# Patient Record
Sex: FEMALE | Race: OTHER | NOT HISPANIC OR LATINO
[De-identification: names, ages, dates, MRNs, and addresses within clinical notes are randomized per-mention and may not be internally consistent; named-entity substitution may affect disease eponyms.]

---

## 2018-06-07 ENCOUNTER — TRANSCRIPTION ENCOUNTER (OUTPATIENT)
Age: 38
End: 2018-06-07

## 2020-02-08 ENCOUNTER — TRANSCRIPTION ENCOUNTER (OUTPATIENT)
Age: 40
End: 2020-02-08

## 2020-11-30 ENCOUNTER — TRANSCRIPTION ENCOUNTER (OUTPATIENT)
Age: 40
End: 2020-11-30

## 2022-01-26 ENCOUNTER — TRANSCRIPTION ENCOUNTER (OUTPATIENT)
Age: 42
End: 2022-01-26

## 2022-06-04 ENCOUNTER — NON-APPOINTMENT (OUTPATIENT)
Age: 42
End: 2022-06-04

## 2023-03-01 ENCOUNTER — NON-APPOINTMENT (OUTPATIENT)
Age: 43
End: 2023-03-01

## 2023-05-15 ENCOUNTER — NON-APPOINTMENT (OUTPATIENT)
Age: 43
End: 2023-05-15

## 2024-01-23 ENCOUNTER — NON-APPOINTMENT (OUTPATIENT)
Age: 44
End: 2024-01-23

## 2024-02-29 ENCOUNTER — APPOINTMENT (OUTPATIENT)
Dept: ORTHOPEDIC SURGERY | Facility: CLINIC | Age: 44
End: 2024-02-29
Payer: COMMERCIAL

## 2024-02-29 VITALS — HEIGHT: 64 IN | BODY MASS INDEX: 29.02 KG/M2 | WEIGHT: 170 LBS

## 2024-02-29 DIAGNOSIS — M76.822 POSTERIOR TIBIAL TENDINITIS, LEFT LEG: ICD-10-CM

## 2024-02-29 PROBLEM — Z00.00 ENCOUNTER FOR PREVENTIVE HEALTH EXAMINATION: Status: ACTIVE | Noted: 2024-02-29

## 2024-02-29 PROCEDURE — L4361: CPT | Mod: LT

## 2024-02-29 PROCEDURE — 99203 OFFICE O/P NEW LOW 30 MIN: CPT

## 2024-02-29 RX ORDER — IBUPROFEN 600 MG/1
600 TABLET ORAL
Qty: 42 | Refills: 0 | Status: ACTIVE | COMMUNITY
Start: 2024-02-29 | End: 1900-01-01

## 2024-02-29 NOTE — HISTORY OF PRESENT ILLNESS
[de-identified] : Patient has had 43-year-old female-year-old female presents for left ankle pain.  Patient has had this pain for 6 months.  No inciting event or injury.  Patient is no swelling and pain.  Last month she went to an urgent care where x-rays were taken and read as negative for fractures.  Patient has not been doing anything for pain relief.  She states she still wears improper footwear, does not rest, does not take any medications.  When she went to urgent care she states they prescribed her something that she tried taking at night that made her very sleepy.  Patient believes it was ibuprofen 800 but also thinks it started with them.

## 2024-02-29 NOTE — DISCUSSION/SUMMARY
[de-identified] : Patient has posterior tibialis tendon of the left lower extremity.  At this time I offered patient a cam walker to weight-bear as tolerated.  Patient will wear this as needed.  I also offered a Medrol Dosepak for the inflammation patient kindly declined.  I am prescribing ibuprofen 600 mg for patient to take 3 times a day as needed for pain and inflammation.  Also advised patient to go to physical therapy for strengthening, stretching, range of motion.  I told patient to also go to podiatrist for orthotics as she may have flatfeet and this can be the source of her problems.  Patient states she has inserts from [employer] already.  Patient would like to follow-up with Dr. Perez soon as possible for injections.  I explained he typically does not do injections for this and he probably will not do them right away as she has not tried any conservative treatment yet.  However she is persistent and would like to see him soon as possible so we will have her follow-up in a few weeks.  This plan all questions were answered today

## 2024-02-29 NOTE — PHYSICAL EXAM
[de-identified] : Physical exam of the left ankle:  -Swelling over posterior tibialis tendon distally. Skin intact -TTP over posterior tibialis tendon distally -Patient has full range of motion foot and ankle -+2 dorsalis pedis pulse  -Sensation intact to light touch

## 2024-02-29 NOTE — DATA REVIEWED
[FreeTextEntry1] : X-ray images right ankle obtained at [facility] urgent care.  Images of the right ankle reveal no acute fractures, dislocations, bony abnormalities

## 2024-03-13 ENCOUNTER — APPOINTMENT (OUTPATIENT)
Dept: ORTHOPEDIC SURGERY | Facility: CLINIC | Age: 44
End: 2024-03-13

## 2024-06-23 ENCOUNTER — NON-APPOINTMENT (OUTPATIENT)
Age: 44
End: 2024-06-23

## 2024-11-22 ENCOUNTER — NON-APPOINTMENT (OUTPATIENT)
Age: 44
End: 2024-11-22

## 2025-03-11 ENCOUNTER — NON-APPOINTMENT (OUTPATIENT)
Age: 45
End: 2025-03-11

## 2025-03-18 ENCOUNTER — APPOINTMENT (OUTPATIENT)
Dept: OBGYN | Facility: CLINIC | Age: 45
End: 2025-03-18
Payer: COMMERCIAL

## 2025-03-18 VITALS
WEIGHT: 180 LBS | BODY MASS INDEX: 30.73 KG/M2 | SYSTOLIC BLOOD PRESSURE: 121 MMHG | DIASTOLIC BLOOD PRESSURE: 82 MMHG | HEIGHT: 64 IN

## 2025-03-18 DIAGNOSIS — N91.1 SECONDARY AMENORRHEA: ICD-10-CM

## 2025-03-18 LAB
APPEARANCE: CLEAR
BILIRUBIN URINE: NEGATIVE
BLOOD URINE: ABNORMAL
COLOR: YELLOW
GLUCOSE QUALITATIVE U: NEGATIVE
KETONES URINE: NEGATIVE
LEUKOCYTE ESTERASE URINE: NEGATIVE
NITRITE URINE: NEGATIVE
PH URINE: 6
PROTEIN URINE: NEGATIVE
SPECIFIC GRAVITY URINE: 1.01
UROBILINOGEN URINE: 0.2 (ref 0.2–?)

## 2025-03-18 PROCEDURE — 99203 OFFICE O/P NEW LOW 30 MIN: CPT

## 2025-03-25 ENCOUNTER — NON-APPOINTMENT (OUTPATIENT)
Age: 45
End: 2025-03-25

## 2025-03-27 ENCOUNTER — EMERGENCY (EMERGENCY)
Facility: HOSPITAL | Age: 45
LOS: 0 days | Discharge: HOME | End: 2025-03-27
Attending: EMERGENCY MEDICINE
Payer: COMMERCIAL

## 2025-03-27 VITALS
RESPIRATION RATE: 18 BRPM | SYSTOLIC BLOOD PRESSURE: 119 MMHG | OXYGEN SATURATION: 99 % | TEMPERATURE: 98 F | DIASTOLIC BLOOD PRESSURE: 81 MMHG | WEIGHT: 160.06 LBS | HEART RATE: 97 BPM

## 2025-03-27 VITALS
SYSTOLIC BLOOD PRESSURE: 100 MMHG | TEMPERATURE: 98 F | HEART RATE: 77 BPM | DIASTOLIC BLOOD PRESSURE: 69 MMHG | RESPIRATION RATE: 18 BRPM | OXYGEN SATURATION: 97 %

## 2025-03-27 LAB
ALBUMIN SERPL ELPH-MCNC: 4.4 G/DL — SIGNIFICANT CHANGE UP (ref 3.5–5.2)
ALP SERPL-CCNC: 58 U/L — SIGNIFICANT CHANGE UP (ref 30–115)
ALT FLD-CCNC: 19 U/L — SIGNIFICANT CHANGE UP (ref 0–41)
ANION GAP SERPL CALC-SCNC: 12 MMOL/L — SIGNIFICANT CHANGE UP (ref 7–14)
APPEARANCE UR: ABNORMAL
APTT BLD: 29.7 SEC — SIGNIFICANT CHANGE UP (ref 27–39.2)
AST SERPL-CCNC: 16 U/L — SIGNIFICANT CHANGE UP (ref 0–41)
BASOPHILS # BLD AUTO: 0.03 K/UL — SIGNIFICANT CHANGE UP (ref 0–0.2)
BASOPHILS NFR BLD AUTO: 0.4 % — SIGNIFICANT CHANGE UP (ref 0–1)
BILIRUB SERPL-MCNC: 0.7 MG/DL — SIGNIFICANT CHANGE UP (ref 0.2–1.2)
BILIRUB UR-MCNC: NEGATIVE — SIGNIFICANT CHANGE UP
BUN SERPL-MCNC: 16 MG/DL — SIGNIFICANT CHANGE UP (ref 10–20)
CALCIUM SERPL-MCNC: 10 MG/DL — SIGNIFICANT CHANGE UP (ref 8.4–10.5)
CHLORIDE SERPL-SCNC: 100 MMOL/L — SIGNIFICANT CHANGE UP (ref 98–110)
CO2 SERPL-SCNC: 25 MMOL/L — SIGNIFICANT CHANGE UP (ref 17–32)
COLOR SPEC: ABNORMAL
CREAT SERPL-MCNC: 0.7 MG/DL — SIGNIFICANT CHANGE UP (ref 0.7–1.5)
DIFF PNL FLD: ABNORMAL
EGFR: 111 ML/MIN/1.73M2 — SIGNIFICANT CHANGE UP
EGFR: 111 ML/MIN/1.73M2 — SIGNIFICANT CHANGE UP
EOSINOPHIL # BLD AUTO: 0.1 K/UL — SIGNIFICANT CHANGE UP (ref 0–0.7)
EOSINOPHIL NFR BLD AUTO: 1.2 % — SIGNIFICANT CHANGE UP (ref 0–8)
GLUCOSE SERPL-MCNC: 113 MG/DL — HIGH (ref 70–99)
GLUCOSE UR QL: NEGATIVE MG/DL — SIGNIFICANT CHANGE UP
HCG SERPL-ACNC: HIGH MIU/ML
HCT VFR BLD CALC: 32.9 % — LOW (ref 37–47)
HGB BLD-MCNC: 11.5 G/DL — LOW (ref 12–16)
IMM GRANULOCYTES NFR BLD AUTO: 0.1 % — SIGNIFICANT CHANGE UP (ref 0.1–0.3)
INR BLD: 1.05 RATIO — SIGNIFICANT CHANGE UP (ref 0.65–1.3)
KETONES UR-MCNC: ABNORMAL MG/DL
LEUKOCYTE ESTERASE UR-ACNC: ABNORMAL
LIDOCAIN IGE QN: 22 U/L — SIGNIFICANT CHANGE UP (ref 7–60)
LYMPHOCYTES # BLD AUTO: 1.2 K/UL — SIGNIFICANT CHANGE UP (ref 1.2–3.4)
LYMPHOCYTES # BLD AUTO: 14.3 % — LOW (ref 20.5–51.1)
MCHC RBC-ENTMCNC: 30.9 PG — SIGNIFICANT CHANGE UP (ref 27–31)
MCHC RBC-ENTMCNC: 35 G/DL — SIGNIFICANT CHANGE UP (ref 32–37)
MCV RBC AUTO: 88.4 FL — SIGNIFICANT CHANGE UP (ref 81–99)
MONOCYTES # BLD AUTO: 0.84 K/UL — HIGH (ref 0.1–0.6)
MONOCYTES NFR BLD AUTO: 10 % — HIGH (ref 1.7–9.3)
NEUTROPHILS # BLD AUTO: 6.21 K/UL — SIGNIFICANT CHANGE UP (ref 1.4–6.5)
NEUTROPHILS NFR BLD AUTO: 74 % — SIGNIFICANT CHANGE UP (ref 42.2–75.2)
NITRITE UR-MCNC: NEGATIVE — SIGNIFICANT CHANGE UP
NRBC BLD AUTO-RTO: 0 /100 WBCS — SIGNIFICANT CHANGE UP (ref 0–0)
PH UR: 6 — SIGNIFICANT CHANGE UP (ref 5–8)
PLATELET # BLD AUTO: 191 K/UL — SIGNIFICANT CHANGE UP (ref 130–400)
PMV BLD: 10 FL — SIGNIFICANT CHANGE UP (ref 7.4–10.4)
POTASSIUM SERPL-MCNC: 4.4 MMOL/L — SIGNIFICANT CHANGE UP (ref 3.5–5)
POTASSIUM SERPL-SCNC: 4.4 MMOL/L — SIGNIFICANT CHANGE UP (ref 3.5–5)
PROT SERPL-MCNC: 6.3 G/DL — SIGNIFICANT CHANGE UP (ref 6–8)
PROT UR-MCNC: 100 MG/DL
PROTHROM AB SERPL-ACNC: 12.4 SEC — SIGNIFICANT CHANGE UP (ref 9.95–12.87)
RBC # BLD: 3.72 M/UL — LOW (ref 4.2–5.4)
RBC # FLD: 12.2 % — SIGNIFICANT CHANGE UP (ref 11.5–14.5)
SODIUM SERPL-SCNC: 137 MMOL/L — SIGNIFICANT CHANGE UP (ref 135–146)
SP GR SPEC: 1.03 — SIGNIFICANT CHANGE UP (ref 1–1.03)
UROBILINOGEN FLD QL: 0.2 MG/DL — SIGNIFICANT CHANGE UP (ref 0.2–1)
WBC # BLD: 8.39 K/UL — SIGNIFICANT CHANGE UP (ref 4.8–10.8)
WBC # FLD AUTO: 8.39 K/UL — SIGNIFICANT CHANGE UP (ref 4.8–10.8)

## 2025-03-27 PROCEDURE — 81001 URINALYSIS AUTO W/SCOPE: CPT

## 2025-03-27 PROCEDURE — 83690 ASSAY OF LIPASE: CPT

## 2025-03-27 PROCEDURE — 76830 TRANSVAGINAL US NON-OB: CPT | Mod: 26

## 2025-03-27 PROCEDURE — 36415 COLL VENOUS BLD VENIPUNCTURE: CPT

## 2025-03-27 PROCEDURE — 96374 THER/PROPH/DIAG INJ IV PUSH: CPT

## 2025-03-27 PROCEDURE — 84702 CHORIONIC GONADOTROPIN TEST: CPT

## 2025-03-27 PROCEDURE — 86900 BLOOD TYPING SEROLOGIC ABO: CPT

## 2025-03-27 PROCEDURE — 85730 THROMBOPLASTIN TIME PARTIAL: CPT

## 2025-03-27 PROCEDURE — 99284 EMERGENCY DEPT VISIT MOD MDM: CPT

## 2025-03-27 PROCEDURE — 85610 PROTHROMBIN TIME: CPT

## 2025-03-27 PROCEDURE — 76830 TRANSVAGINAL US NON-OB: CPT

## 2025-03-27 PROCEDURE — 80053 COMPREHEN METABOLIC PANEL: CPT

## 2025-03-27 PROCEDURE — 86901 BLOOD TYPING SEROLOGIC RH(D): CPT

## 2025-03-27 PROCEDURE — 86850 RBC ANTIBODY SCREEN: CPT

## 2025-03-27 PROCEDURE — 85025 COMPLETE CBC W/AUTO DIFF WBC: CPT

## 2025-03-27 PROCEDURE — 99284 EMERGENCY DEPT VISIT MOD MDM: CPT | Mod: 25

## 2025-03-27 RX ORDER — IBUPROFEN 200 MG
4 TABLET ORAL
Qty: 60 | Refills: 0
Start: 2025-03-27 | End: 2025-03-31

## 2025-03-27 RX ORDER — ONDANSETRON HCL/PF 4 MG/2 ML
4 VIAL (ML) INJECTION ONCE
Refills: 0 | Status: COMPLETED | OUTPATIENT
Start: 2025-03-27 | End: 2025-03-27

## 2025-03-27 RX ORDER — SODIUM CHLORIDE 9 G/1000ML
1000 INJECTION, SOLUTION INTRAVENOUS ONCE
Refills: 0 | Status: COMPLETED | OUTPATIENT
Start: 2025-03-27 | End: 2025-03-27

## 2025-03-27 RX ORDER — ONDANSETRON HCL/PF 4 MG/2 ML
1 VIAL (ML) INJECTION
Qty: 2 | Refills: 0
Start: 2025-03-27 | End: 2025-03-29

## 2025-03-27 RX ORDER — ACETAMINOPHEN 500 MG/5ML
975 LIQUID (ML) ORAL ONCE
Refills: 0 | Status: COMPLETED | OUTPATIENT
Start: 2025-03-27 | End: 2025-03-27

## 2025-03-27 RX ADMIN — Medication 4 MILLIGRAM(S): at 07:25

## 2025-03-27 RX ADMIN — SODIUM CHLORIDE 1000 MILLILITER(S): 9 INJECTION, SOLUTION INTRAVENOUS at 03:46

## 2025-03-27 RX ADMIN — Medication 975 MILLIGRAM(S): at 03:45

## 2025-03-27 NOTE — ED PROVIDER NOTE - CARE PROVIDER_API CALL
Willis Ribera  Internal Medicine  [address] [place] [address]  Phone: [phone]  Fax: [phone]  Follow Up Time: 1-3 Days

## 2025-03-27 NOTE — CONSULT NOTE ADULT - SUBJECTIVE AND OBJECTIVE BOX
Chief Complaint: vaginal bleeding    HPI:   43y/o  LMP 25 with previously confirmed IUP on TVUS on 3/18 in PMD's office presents to the ED with vaginal bleeding. Pregnancy is planned and desired. Pt explains she began experiencing new vaginal bleeding at 1700 the previous night. Pt has since been saturating 1 maxi pad per hour, passing palm sized clots. Bleeding was initially associated with diffuse lower abdominal cramping, 9/10 pain, mild nausea, and mild palpitations. Nausea and palpitations have since resolved, Pt explains pain relieved with Tylenol. Denies other symptoms at this time including dizziness, vomiting, CP, SOB, fevers, chills, dysuria, abnormal vaginal discharge.   Pt follows with Dr. Franco and has an appointment on Tuesday.      Ob/Gyn History:    sAB x3, no D&C  FT  x1  FT CS x1 for thrombocytopenia    LMP - 25, regular  Denies history of ovarian cysts, uterine fibroids, abnormal paps, or STIs    Home Medications:  Metformin    Allergies  Cephalosporins (anaphylaxis)  PCN (anaphylaxis)     Intolerances  None    PAST MEDICAL & SURGICAL HISTORY:  preDM (prediabetes), currently on metformin    FAMILY HISTORY:  Denies    SOCIAL HISTORY: Denies cigarette use, alcohol use, or illicit drug use    Vital Signs Last 24 Hrs  T(F): 97.6 (27 Mar 2025 07:25), Max: 98.2 (27 Mar 2025 03:04)  HR: 77 (27 Mar 2025 07:25) (77 - 97)  BP: 100/69 (27 Mar 2025 07:25) (100/69 - 119/81)  RR: 18 (27 Mar 2025 07:25) (18 - 18)    General Appearance - AAOx3, NAD    Abdomen:  Soft, nontender, nondistended, no rebound, no rigidity, no guarding, bowel sounds present    GYN/Pelvis:  Labia Majora - Normal  Labia Minora - Normal  Clitoris - Normal  Urethra - Normal  Vagina - Normal, 3cc dark blood in vaginal vault  Cervix - Closed cervix, no active bleeding    Uterus:  Size - Normal  Tenderness - None  Mass - None  Freely mobile    Adnexa:  Masses - None  Tenderness - None      LABS:                        11.5   8.39  )-----------( 191      ( 27 Mar 2025 04:05 )             32.9     HCG Quantitative, Serum: 13472.0 mIU/mL (25 @ 04:05)    ABO RH Interpretation: O POS (25 @ 04:05)  Antibody Screen: NEG (25 @ 04:05)        137  |  100  |  16  ----------------------------<  113[H]  4.4   |  25  |  0.7    Ca    10.0      27 Mar 2025 04:05    TPro  6.3  /  Alb  4.4  /  TBili  0.7  /  DBili  x   /  AST  16  /  ALT  19  /  AlkPhos  58      PT/INR - ( 27 Mar 2025 04:05 )   PT: 12.40 sec;   INR: 1.05 ratio         PTT - ( 27 Mar 2025 04:05 )  PTT:29.7 sec  Urinalysis Basic - ( 27 Mar 2025 05:00 )    Color: Orange / Appearance: Cloudy / S.027 / pH: x  Gluc: x / Ketone: Trace mg/dL  / Bili: Negative / Urobili: 0.2 mg/dL   Blood: x / Protein: 100 mg/dL / Nitrite: Negative   Leuk Esterase: Trace / RBC: 925 /HPF / WBC 2 /HPF   Sq Epi: x / Non Sq Epi: 2 /HPF / Bacteria: Negative /HPF          RADIOLOGY & ADDITIONAL STUDIES:  < from: US Transvaginal (25 @ 06:26) >  PROCEDURE DATE:  2025          INTERPRETATION:  CLINICAL INFORMATION: Vaginal bleeding    LMP: 2025    COMPARISON: US pelvis 2010    TECHNIQUE:  Endovaginal and transabdominal pelvic sonogram. Color and Spectral   Doppler was performed.    FINDINGS:    UTERUS: Measures 11.5 x 6.0 x 7.1 cm. lower intrauterine gestational sac.   There is a thickened, heterogeneous endometrium, which may reflect blood   products.    Gestational Sac Shape: Abnormal.  Crown Rump Length: 0.51 cm unchanged transvaginal examination.  Estimated Gestational Age: 6 weeks and 2 days.  Fetal Heart Rate: No fetal cardiac activity is detected.    Right ovary: 3.1 cm x 2.3 cm x 2.6 cm. Within normal limits. Doppler flow   demonstrated to the right ovary.  Left ovary: 3.6 cm x 2.0 cm x 1.9 cm. Within normal limits. Doppler flow   demonstrated to the left ovary.    Fluid: None.    IMPRESSION:    Findings are suspicious for, but not diagnostic of, pregnancy failure:   Pregnancy in the lower uterine segment, with fetal pole crown-rump length   of 0.51 cm. No fetal cardiac activity is detected. Abnormal-shaped   gestational sac.    Thickened, heterogeneous endometrium, which may reflect blood products.    Correlation with serial beta hCG levels is recommended with short   interval follow-up pelvic ultrasound in 1-2 weeks as clinically warranted.    --- End of Report ---    < end of copied text >   Chief Complaint: vaginal bleeding    HPI:   45y/o  LMP 25 with previously confirmed IUP on TVUS on 3/18 in PMD's office presents to the ED with vaginal bleeding. Pregnancy is planned and desired. Pt explains she began experiencing new vaginal bleeding at 1700 the previous night. Pt has since been saturating 1 maxi pad per hour, passing palm sized clots. Bleeding was initially associated with diffuse lower abdominal cramping, 9/10 pain, mild nausea, and mild palpitations. Nausea and palpitations have since resolved, Pt explains pain relieved with Tylenol. In offic on 3/18, patient had TVUS which showed clear GS, YS and fetal pole without FHR, S<D. Denies other symptoms at this time including dizziness, vomiting, CP, SOB, fevers, chills, dysuria, abnormal vaginal discharge.   Pt follows with Dr. Franco and has an appointment on Tuesday.      Ob/Gyn History:    sAB x3, no D&C  FT  x1  FT CS x1 for thrombocytopenia    LMP - 25, regular  Denies history of ovarian cysts, uterine fibroids, abnormal paps, or STIs    Home Medications:  Metformin    Allergies  Cephalosporins (anaphylaxis)  PCN (anaphylaxis)     Intolerances  None    PAST MEDICAL & SURGICAL HISTORY:  preDM (prediabetes), currently on metformin    FAMILY HISTORY:  Denies    SOCIAL HISTORY: Denies cigarette use, alcohol use, or illicit drug use    Vital Signs Last 24 Hrs  T(F): 97.6 (27 Mar 2025 07:25), Max: 98.2 (27 Mar 2025 03:04)  HR: 77 (27 Mar 2025 07:25) (77 - 97)  BP: 100/69 (27 Mar 2025 07:25) (100/69 - 119/81)  RR: 18 (27 Mar 2025 07:25) (18 - 18)    General Appearance - AAOx3, NAD    Abdomen:  Soft, nontender, nondistended, no rebound, no rigidity, no guarding, bowel sounds present    GYN/Pelvis:  Labia Majora - Normal  Labia Minora - Normal  Clitoris - Normal  Urethra - Normal  Vagina - Normal, 3cc dark blood in vaginal vault  Cervix - Closed cervix, no active bleeding    Uterus:  Size - Normal  Tenderness - None  Mass - None  Freely mobile    Adnexa:  Masses - None  Tenderness - None      LABS:                        11.5   8.39  )-----------( 191      ( 27 Mar 2025 04:05 )             32.9     HCG Quantitative, Serum: 43638.0 mIU/mL (25 @ 04:05)    ABO RH Interpretation: O POS (25 @ 04:05)  Antibody Screen: NEG (25 @ 04:05)        137  |  100  |  16  ----------------------------<  113[H]  4.4   |  25  |  0.7    Ca    10.0      27 Mar 2025 04:05    TPro  6.3  /  Alb  4.4  /  TBili  0.7  /  DBili  x   /  AST  16  /  ALT  19  /  AlkPhos  58      PT/INR - ( 27 Mar 2025 04:05 )   PT: 12.40 sec;   INR: 1.05 ratio         PTT - ( 27 Mar 2025 04:05 )  PTT:29.7 sec  Urinalysis Basic - ( 27 Mar 2025 05:00 )    Color: Orange / Appearance: Cloudy / S.027 / pH: x  Gluc: x / Ketone: Trace mg/dL  / Bili: Negative / Urobili: 0.2 mg/dL   Blood: x / Protein: 100 mg/dL / Nitrite: Negative   Leuk Esterase: Trace / RBC: 925 /HPF / WBC 2 /HPF   Sq Epi: x / Non Sq Epi: 2 /HPF / Bacteria: Negative /HPF          RADIOLOGY & ADDITIONAL STUDIES:  < from: US Transvaginal (25 @ 06:26) >  PROCEDURE DATE:  2025          INTERPRETATION:  CLINICAL INFORMATION: Vaginal bleeding    LMP: 2025    COMPARISON: US pelvis 2010    TECHNIQUE:  Endovaginal and transabdominal pelvic sonogram. Color and Spectral   Doppler was performed.    FINDINGS:    UTERUS: Measures 11.5 x 6.0 x 7.1 cm. lower intrauterine gestational sac.   There is a thickened, heterogeneous endometrium, which may reflect blood   products.    Gestational Sac Shape: Abnormal.  Crown Rump Length: 0.51 cm unchanged transvaginal examination.  Estimated Gestational Age: 6 weeks and 2 days.  Fetal Heart Rate: No fetal cardiac activity is detected.    Right ovary: 3.1 cm x 2.3 cm x 2.6 cm. Within normal limits. Doppler flow   demonstrated to the right ovary.  Left ovary: 3.6 cm x 2.0 cm x 1.9 cm. Within normal limits. Doppler flow   demonstrated to the left ovary.    Fluid: None.    IMPRESSION:    Findings are suspicious for, but not diagnostic of, pregnancy failure:   Pregnancy in the lower uterine segment, with fetal pole crown-rump length   of 0.51 cm. No fetal cardiac activity is detected. Abnormal-shaped   gestational sac.    Thickened, heterogeneous endometrium, which may reflect blood products.    Correlation with serial beta hCG levels is recommended with short   interval follow-up pelvic ultrasound in 1-2 weeks as clinically warranted.    --- End of Report ---    < end of copied text >

## 2025-03-27 NOTE — ED PROVIDER NOTE - NSFOLLOWUPINSTRUCTIONS_ED_ALL_ED_FT
Please follow up with Dr. Franco.     Threatened Miscarriage    A threatened miscarriage is the term for vaginal bleeding during your first 20 weeks of pregnancy but the pregnancy has not ended. If you have vaginal bleeding during this time, your health care provider will do tests to check if you are still pregnant. If the tests show you are still pregnant and the developing baby (fetus) inside your womb (uterus) is still growing, your condition is considered a threatened miscarriage. A threatened miscarriage does not mean your pregnancy will end, but it does increase the risk of losing your pregnancy. It is important to have close follow up with your obstetrician.    SEEK IMMEDIATE MEDICAL CARE IF YOU HAVE ANY OF THE FOLLOWING SYMPTOMS: heavy vaginal bleeding, severe low back or abdominal cramps, fever/chills, or lightheadedness/dizziness.

## 2025-03-27 NOTE — ED PROVIDER NOTE - OBJECTIVE STATEMENT
44-year-old female with PMH of diabetes,  with 2 prior miscarriages, estimated approx 9 weeks pregnant by LMP, presents to ED with vaginal bleeding x1 week.  Patient reports mild bleeding over the last week however heavy over the last 2 days now with clots.  Reports lower abdominal cramping and states she think she is miscarrying.  States she last saw her OB/GYN Dr. Franco for 6-week appointment, at that time fetus measured only 4 weeks by TVUS.  Reports feeling tired and dizzy like she is going to pass out.  No CP, SOB, palpitations, syncope, N/V/D.

## 2025-03-27 NOTE — ED ADULT NURSE NOTE - OBJECTIVE STATEMENT
pt states she is 6 weeks pregnant, has been spotting and bleeding since last week but has gotten worse over the last 48 hours  vaginal bleeding

## 2025-03-27 NOTE — ED PROVIDER NOTE - PHYSICAL EXAMINATION
VITAL SIGNS: I have reviewed nursing notes and confirm.  CONSTITUTIONAL: tired appearing but in no acute distress.  SKIN: Skin exam is warm and dry +pallor  EYES: PERRL, (+)conjunctival pallor  ENT: mmm  CARD: S1, S2 normal; no murmurs, gallops, or rubs. Regular rate and rhythm.  RESP: Normal respiratory effort, no tachypnea or distress. Lungs CTAB, no wheezes, rales or rhonchi.  ABD: soft, ND, (+) mild suprapubic ttp no guarding or rigidity  external pelvic exam chaperoned by Dr. Wolfe shows mild blood on pad, but oozing from vaginal canal + clots  EXT: Normal ROM.   NEURO: Alert, oriented. Grossly unremarkable. No focal deficits.  PSYCH: Cooperative, appropriate.

## 2025-03-27 NOTE — ED PROVIDER NOTE - CARE PROVIDERS DIRECT ADDRESSES
Quality 431: Preventive Care And Screening: Unhealthy Alcohol Use - Screening: Patient identified as an unhealthy alcohol user when screened for unhealthy alcohol use using a systematic screening method and received brief counseling
Quality 226: Preventive Care And Screening: Tobacco Use: Screening And Cessation Intervention: Patient screened for tobacco use and is an ex/non-smoker
Detail Level: Detailed
,DirectAddress_Unknown

## 2025-03-27 NOTE — CONSULT NOTE ADULT - ASSESSMENT
45y/o  LMP 25 with previously confirmed IUP on TVUS on 3/18 in PMD's office presents to the ED with vaginal bleeding, now with Hb of 11.5 and TVUS read showing IUP with no cardiac activity in lower uterine segment. Likely sAB.    45y/o  LMP 25 with previously confirmed IUP on TVUS on 3/18 in PMD's office presents to the ED with vaginal bleeding, now with Hb of 11.5 and TVUS read showing IUP with no cardiac activity in lower uterine segment. Likely sAB.     -Pt counseled on active (medication vs surgical) vs expectant management for mAB, pt interested most in medical management today  -Pt counseled on expectations, side effects, and follow up for cytotec management of mAB  -Please give the patient cytotec 800mcg buccally  -Please send the patient home with ibuprofen 800mg q8hrs PRN for pain control and Promethazine or Zofran for nausea  -Ectopic and bleeding precautions reviewed  -Pt to f/u with Dr. Franco per routine   45y/o  LMP 25 with IUP, with findings suspicious for active SAB, currently clinically and hemodynamically stable.    -Pt counseled on active (medication vs surgical) vs expectant management, pt interested most in medical management today  -Pt counseled on expectations, side effects, and follow up for cytotec management   -Please give the patient cytotec 800mcg buccally  -Please send the patient home with ibuprofen 800mg q8hrs PRN for pain control and Promethazine or Zofran for nausea  -bleeding precautions reviewed  -Pt to f/u with Dr. Franco in office    Discussed with Dr. Castellon and Dr. Franco

## 2025-03-27 NOTE — ED PROVIDER NOTE - PATIENT PORTAL LINK FT
You can access the FollowMyHealth Patient Portal offered by [facility] by registering at the following website: [email or web address]. By joining [employer]’s FollowMyHealth portal, you will also be able to view your health information using other applications (apps) compatible with our system.

## 2025-03-27 NOTE — ED PROVIDER NOTE - ATTENDING CONTRIBUTION TO CARE
44-year-old female -0-2-2 presents for evaluation of increasing vaginal bleeding.  Patient was told by her GYN she is having a miscarriage and has been having spotting for the past 2 days but started to have heavier bleeding and cramping this evening.  Nuys any fevers or chills, urinary symptoms or flank pain.  No chest pain, shortness of breath, palpitations or weakness.  GYN is Dr. Franco.     VITAL SIGNS: noted  CONSTITUTIONAL: Well-developed; well-nourished; in no acute distress  HEAD: Normocephalic; atraumatic  EYES: PERRL, EOM intact; conjunctiva and sclera clear  ENT: No nasal discharge; airway clear. MMM  NECK: Supple; non tender.   CARD: S1, S2 normal; no murmurs, gallops, or rubs. Regular rate and rhythm  RESP: CTAB/L, no wheezes, rales or rhonchi  ABD: Normal bowel sounds; soft; non-distended; Mild suprapubic tenderness, no rebound or guarding, no lower abdominal tenderness; no CVA tenderness  EXT: Normal ROM. No calf tenderness or edema. Distal pulses intact  NEURO: Alert, oriented. Grossly unremarkable. No focal deficits  SKIN: Skin exam is warm and dry, no acute rash 44-year-old female with PMH DM, anemia -0-2-2 presents for evaluation of increasing vaginal bleeding.  Patient was told by her GYN she is having a miscarriage and has been having spotting for the past 2 days but started to have heavier bleeding and cramping this evening.  Nuys any fevers or chills, urinary symptoms or flank pain.  No chest pain, shortness of breath, palpitations or weakness.  GYN is Dr. Franco.     VITAL SIGNS: noted  CONSTITUTIONAL: Well-developed; well-nourished; in no acute distress  HEAD: Normocephalic; atraumatic  EYES: PERRL, EOM intact; conjunctiva and sclera clear  ENT: No nasal discharge; airway clear. MMM  NECK: Supple; non tender.   CARD: S1, S2 normal; no murmurs, gallops, or rubs. Regular rate and rhythm  RESP: CTAB/L, no wheezes, rales or rhonchi  ABD: Normal bowel sounds; soft; non-distended; Mild suprapubic tenderness, no rebound or guarding, no lower abdominal tenderness; no CVA tenderness  EXT: Normal ROM. No calf tenderness or edema. Distal pulses intact  NEURO: Alert, oriented. Grossly unremarkable. No focal deficits  SKIN: Skin exam is warm and dry, no acute rash

## 2025-03-27 NOTE — ED ADULT TRIAGE NOTE - CHIEF COMPLAINT QUOTE
pt states she is 6 weeks pregnant, has been spotting and bleeding since last week but has gotten worse over the last 48 hours

## 2025-04-01 ENCOUNTER — NON-APPOINTMENT (OUTPATIENT)
Age: 45
End: 2025-04-01

## 2025-04-02 ENCOUNTER — APPOINTMENT (OUTPATIENT)
Dept: OBGYN | Facility: CLINIC | Age: 45
End: 2025-04-02
Payer: COMMERCIAL

## 2025-04-02 VITALS
SYSTOLIC BLOOD PRESSURE: 113 MMHG | HEIGHT: 64 IN | WEIGHT: 180 LBS | BODY MASS INDEX: 30.73 KG/M2 | DIASTOLIC BLOOD PRESSURE: 80 MMHG

## 2025-04-02 PROBLEM — O03.9 SPONTANEOUS ABORTION: Status: ACTIVE | Noted: 2025-04-02 | Resolved: 2025-07-01

## 2025-04-02 PROCEDURE — 99213 OFFICE O/P EST LOW 20 MIN: CPT

## 2025-04-03 ENCOUNTER — LABORATORY RESULT (OUTPATIENT)
Age: 45
End: 2025-04-03

## 2025-04-03 PROBLEM — E11.9 TYPE 2 DIABETES MELLITUS WITHOUT COMPLICATIONS: Chronic | Status: ACTIVE | Noted: 2025-03-27

## 2025-04-04 ENCOUNTER — NON-APPOINTMENT (OUTPATIENT)
Age: 45
End: 2025-04-04

## 2025-04-04 LAB
APPEARANCE: ABNORMAL
BASOPHILS # BLD AUTO: 0.03 K/UL
BASOPHILS NFR BLD AUTO: 0.5 %
BILIRUBIN URINE: NEGATIVE
BLOOD URINE: ABNORMAL
COLOR: ABNORMAL
EOSINOPHIL # BLD AUTO: 0.18 K/UL
EOSINOPHIL NFR BLD AUTO: 3.3 %
FERRITIN SERPL-MCNC: 12 NG/ML
GLUCOSE QUALITATIVE U: NEGATIVE MG/DL
HCG SERPL-MCNC: 740 MIU/ML
HCT VFR BLD CALC: 20.5 %
HGB BLD-MCNC: 6.6 G/DL
IMM GRANULOCYTES NFR BLD AUTO: 2.9 %
KETONES URINE: NEGATIVE MG/DL
LEUKOCYTE ESTERASE URINE: ABNORMAL
LYMPHOCYTES # BLD AUTO: 1.24 K/UL
LYMPHOCYTES NFR BLD AUTO: 22.6 %
MAN DIFF?: NORMAL
MCHC RBC-ENTMCNC: 30.8 PG
MCHC RBC-ENTMCNC: 32.2 G/DL
MCV RBC AUTO: 95.8 FL
MONOCYTES # BLD AUTO: 0.6 K/UL
MONOCYTES NFR BLD AUTO: 10.9 %
NEUTROPHILS # BLD AUTO: 3.27 K/UL
NEUTROPHILS NFR BLD AUTO: 59.8 %
NITRITE URINE: NEGATIVE
PH URINE: 6.5
PLATELET # BLD AUTO: 244 K/UL
PMV BLD AUTO: 0 /100 WBCS
PMV BLD: 10.1 FL
PROTEIN URINE: 100 MG/DL
RBC # BLD: 2.14 M/UL
RBC # FLD: 12.6 %
SPECIFIC GRAVITY URINE: 1.02
UROBILINOGEN URINE: 0.2 MG/DL
WBC # FLD AUTO: 5.48 K/UL

## 2025-04-11 ENCOUNTER — APPOINTMENT (OUTPATIENT)
Dept: OBGYN | Facility: CLINIC | Age: 45
End: 2025-04-11
Payer: COMMERCIAL

## 2025-04-11 VITALS
BODY MASS INDEX: 27.31 KG/M2 | WEIGHT: 160 LBS | SYSTOLIC BLOOD PRESSURE: 109 MMHG | DIASTOLIC BLOOD PRESSURE: 73 MMHG | HEIGHT: 64 IN

## 2025-04-11 DIAGNOSIS — O03.9 COMPLETE OR UNSPECIFIED SPONTANEOUS ABORTION W/OUT COMPLICATION: ICD-10-CM

## 2025-04-11 LAB
APPEARANCE: CLEAR
BILIRUBIN URINE: NEGATIVE
BLOOD URINE: ABNORMAL
COLOR: YELLOW
GLUCOSE QUALITATIVE U: NEGATIVE
KETONES URINE: NEGATIVE
LEUKOCYTE ESTERASE URINE: NEGATIVE
NITRITE URINE: NEGATIVE
PH URINE: 8.5
PROTEIN URINE: NEGATIVE
SPECIFIC GRAVITY URINE: 1.02
UROBILINOGEN URINE: 0.2 (ref 0.2–?)

## 2025-04-11 PROCEDURE — 99213 OFFICE O/P EST LOW 20 MIN: CPT
